# Patient Record
Sex: MALE | Race: WHITE | NOT HISPANIC OR LATINO | Employment: FULL TIME | URBAN - METROPOLITAN AREA
[De-identification: names, ages, dates, MRNs, and addresses within clinical notes are randomized per-mention and may not be internally consistent; named-entity substitution may affect disease eponyms.]

---

## 2023-08-24 ENCOUNTER — OFFICE VISIT (OUTPATIENT)
Dept: FAMILY MEDICINE CLINIC | Facility: CLINIC | Age: 28
End: 2023-08-24
Payer: COMMERCIAL

## 2023-08-24 VITALS
SYSTOLIC BLOOD PRESSURE: 144 MMHG | TEMPERATURE: 99.5 F | DIASTOLIC BLOOD PRESSURE: 96 MMHG | BODY MASS INDEX: 32.96 KG/M2 | HEART RATE: 107 BPM | HEIGHT: 67 IN | OXYGEN SATURATION: 97 % | RESPIRATION RATE: 18 BRPM | WEIGHT: 210 LBS

## 2023-08-24 DIAGNOSIS — U07.1 COVID-19 VIRUS INFECTION: ICD-10-CM

## 2023-08-24 DIAGNOSIS — E66.09 CLASS 1 OBESITY DUE TO EXCESS CALORIES WITH SERIOUS COMORBIDITY AND BODY MASS INDEX (BMI) OF 32.0 TO 32.9 IN ADULT: ICD-10-CM

## 2023-08-24 DIAGNOSIS — R03.0 ELEVATED BP WITHOUT DIAGNOSIS OF HYPERTENSION: ICD-10-CM

## 2023-08-24 PROBLEM — J30.9 ALLERGIC RHINITIS: Status: ACTIVE | Noted: 2023-08-24

## 2023-08-24 LAB
EXPIRATION DATE: ABNORMAL
EXPIRATION DATE: NORMAL
EXPIRATION DATE: NORMAL
FLUAV AG NPH QL: NEGATIVE
FLUBV AG NPH QL: NEGATIVE
INTERNAL CONTROL: ABNORMAL
INTERNAL CONTROL: NORMAL
INTERNAL CONTROL: NORMAL
Lab: ABNORMAL
Lab: NORMAL
Lab: NORMAL
S PYO AG THROAT QL: NEGATIVE
SARS-COV-2 AG UPPER RESP QL IA.RAPID: DETECTED

## 2023-08-24 RX ORDER — GUAIFENESIN AND DEXTROMETHORPHAN HYDROBROMIDE 600; 30 MG/1; MG/1
TABLET, EXTENDED RELEASE ORAL DAILY
COMMUNITY
Start: 2014-02-07

## 2023-08-24 RX ORDER — PSEUDOEPHEDRINE HCL 120 MG/1
120 TABLET, FILM COATED, EXTENDED RELEASE ORAL EVERY 12 HOURS
COMMUNITY

## 2023-08-24 RX ORDER — CETIRIZINE HYDROCHLORIDE 10 MG/1
10 TABLET ORAL DAILY
COMMUNITY

## 2023-08-24 NOTE — LETTER
August 24, 2023     Patient: Tavon Jackson   YOB: 1995   Date of Visit: 8/24/2023       To Whom It May Concern:    It is my medical opinion that Tavon Jackson may return to work on 8/29. Please excuse from 8/25/23-8/28/23         Sincerely,        Israel Fields MD    CC: No Recipients

## 2023-08-24 NOTE — PROGRESS NOTES
"  Established Patient Office Visit        Subjective      Chief Complaint:  Generalized Body Aches (Body aches, cough, congestion, sore throat, wife has been ill but diagnosed with viral infection and starting to feel better.  Symptoms started yesterday.)      History of Present Illness: Tavon Jackson is a 27 y.o. male who presents for   +body aches, congestion, no dyspnea. Chills. Started yesterday.     Patient Active Problem List   Diagnosis    Allergic rhinitis    Obesity due to excess calories with serious comorbidity    Elevated BP without diagnosis of hypertension         Current Outpatient Medications:     cetirizine (zyrTEC) 10 MG tablet, Take 1 tablet by mouth Daily. Take as directed, Disp: , Rfl:     guaifenesin-dextromethorphan (MUCINEX DM)  MG tablet sustained-release 12 hour tablet, Take  by mouth Daily., Disp: , Rfl:     pseudoephedrine (SUDAFED) 120 MG 12 hr tablet, Take 1 tablet by mouth Every 12 (Twelve) Hours. Takes otc  as needed, Disp: , Rfl:        Objective     Physical Exam:   Vital Signs:   /96 (BP Location: Right arm, Patient Position: Sitting, Cuff Size: Adult)   Pulse 107   Temp 99.5 øF (37.5 øC) (Oral)   Resp 18   Ht 170.2 cm (67\")   Wt 95.3 kg (210 lb)   SpO2 97%   BMI 32.89 kg/mý      Physical Exam  Constitutional:       General: He is not in acute distress.     Appearance: He is not ill-appearing.   Cardiovascular:      Rate and Rhythm: Normal rate and regular rhythm.   Pulmonary:      Effort: Pulmonary effort is normal.      Breath sounds: Normal breath sounds.   Neurological:      Mental Status: He is alert.   Psychiatric:         Thought Content: Thought content normal.   Left TM mild erythema with clear effusion. Right TM WNL.          Assessment / Plan      Assessment/Plan:   Diagnoses and all orders for this visit:    1. COVID-19 virus infection  -     POCT rapid strep A  -     POCT Influenza A/B  -     POCT SARS-CoV-2 Antigen BILL    2. Class 1 obesity due to " excess calories with serious comorbidity and body mass index (BMI) of 32.0 to 32.9 in adult  Discussed healthy diet and exercise. Hand out given.     3. Elevated BP without diagnosis of hypertension  Assessment & Plan:  Likely 2/2 acute illness recheck in 3 months       Left ear with some clear effusion. Monitor for symptoms.     It is recommendation by the [place] [facility] to isolate.  If you are asymptomatic at day 5 you can leave isolation if you wear a mask at all times.  If you remain symptomatic or not improving it is recommended that you isolate for 10 days.  Ultimately studies are showing COVID may be contagious up to 10 days.    Tylenol and ibuprofen as needed. I do not recommend any further treatments at this time.  If there is any concern for worsening you can consider buying a pulse oximeter and your oxygen saturation should be greater than 94%.  For any worsening you should call or seek care.      Follow Up:   Return in about 3 months (around 11/24/2023) for Wellness visit.      MDM:     Israel Fields MD  Family Medicine - [place] [facility] [place]

## 2024-01-01 PROCEDURE — 87635 SARS-COV-2 COVID-19 AMP PRB: CPT | Performed by: NURSE PRACTITIONER

## 2024-01-19 ENCOUNTER — LAB (OUTPATIENT)
Dept: LAB | Facility: HOSPITAL | Age: 29
End: 2024-01-19
Payer: COMMERCIAL

## 2024-01-19 ENCOUNTER — OFFICE VISIT (OUTPATIENT)
Dept: FAMILY MEDICINE CLINIC | Facility: CLINIC | Age: 29
End: 2024-01-19
Payer: COMMERCIAL

## 2024-01-19 VITALS
TEMPERATURE: 98.6 F | WEIGHT: 205 LBS | HEIGHT: 69 IN | OXYGEN SATURATION: 98 % | SYSTOLIC BLOOD PRESSURE: 118 MMHG | BODY MASS INDEX: 30.36 KG/M2 | HEART RATE: 97 BPM | DIASTOLIC BLOOD PRESSURE: 86 MMHG

## 2024-01-19 DIAGNOSIS — E66.09 CLASS 1 OBESITY DUE TO EXCESS CALORIES WITH SERIOUS COMORBIDITY AND BODY MASS INDEX (BMI) OF 30.0 TO 30.9 IN ADULT: ICD-10-CM

## 2024-01-19 DIAGNOSIS — L70.9 ACNE, UNSPECIFIED ACNE TYPE: ICD-10-CM

## 2024-01-19 DIAGNOSIS — Z00.00 ENCOUNTER FOR ROUTINE ADULT HEALTH EXAMINATION WITHOUT ABNORMAL FINDINGS: ICD-10-CM

## 2024-01-19 DIAGNOSIS — L98.9 SCALP LESION: ICD-10-CM

## 2024-01-19 LAB
ALBUMIN SERPL-MCNC: 4.5 G/DL (ref 3.5–5.2)
ALBUMIN/GLOB SERPL: 1.6 G/DL
ALP SERPL-CCNC: 70 U/L (ref 39–117)
ALT SERPL W P-5'-P-CCNC: 25 U/L (ref 1–41)
ANION GAP SERPL CALCULATED.3IONS-SCNC: 14 MMOL/L (ref 5–15)
AST SERPL-CCNC: 19 U/L (ref 1–40)
BILIRUB SERPL-MCNC: 0.4 MG/DL (ref 0–1.2)
BUN SERPL-MCNC: 14 MG/DL (ref 6–20)
BUN/CREAT SERPL: 15.4 (ref 7–25)
CALCIUM SPEC-SCNC: 9.2 MG/DL (ref 8.6–10.5)
CHLORIDE SERPL-SCNC: 102 MMOL/L (ref 98–107)
CHOLEST SERPL-MCNC: 209 MG/DL (ref 0–200)
CO2 SERPL-SCNC: 23 MMOL/L (ref 22–29)
CREAT SERPL-MCNC: 0.91 MG/DL (ref 0.76–1.27)
EGFRCR SERPLBLD CKD-EPI 2021: 118.5 ML/MIN/1.73
GLOBULIN UR ELPH-MCNC: 2.8 GM/DL
GLUCOSE SERPL-MCNC: 85 MG/DL (ref 65–99)
HDLC SERPL-MCNC: 30 MG/DL (ref 40–60)
LDLC SERPL CALC-MCNC: 148 MG/DL (ref 0–100)
LDLC/HDLC SERPL: 4.85 {RATIO}
POTASSIUM SERPL-SCNC: 4.3 MMOL/L (ref 3.5–5.2)
PROT SERPL-MCNC: 7.3 G/DL (ref 6–8.5)
SODIUM SERPL-SCNC: 139 MMOL/L (ref 136–145)
TRIGL SERPL-MCNC: 167 MG/DL (ref 0–150)
VLDLC SERPL-MCNC: 31 MG/DL (ref 5–40)

## 2024-01-19 PROCEDURE — 80061 LIPID PANEL: CPT

## 2024-01-19 PROCEDURE — 80053 COMPREHEN METABOLIC PANEL: CPT

## 2024-01-19 RX ORDER — CLINDAMYCIN PHOSPHATE 10 MG/G
1 GEL TOPICAL NIGHTLY
Qty: 60 G | Refills: 2 | Status: SHIPPED | OUTPATIENT
Start: 2024-01-19

## 2024-01-19 NOTE — PROGRESS NOTES
"     Male Physical Note      Patient Name: Tavon Jackson  : 1995   MRN: [id number]     Subjective    Subjective     Chief Complaint:    Chief Complaint   Patient presents with    well adult exam       History of Present Illness: Tavon Jackson is a 28 y.o. male who is here today for their annual health maintenance and physical.      Did have cold in December negative viral testing on 24.     Past Medical History, Social History, Family History and Care Team were all reviewed with patient and updated as appropriate.     Medications:     Current Outpatient Medications:     fexofenadine (ALLEGRA) 180 MG tablet, Take 1 tablet by mouth Daily., Disp: , Rfl:     guaifenesin-dextromethorphan (MUCINEX DM)  MG tablet sustained-release 12 hour tablet, Take  by mouth Daily., Disp: , Rfl:     Benzoyl Peroxide 10 % liquid, Apply small amount to skin leave on 5 minutes then was off., Disp: 227 g, Rfl: 2    clindamycin (Clindagel) 1 % gel, Apply 1 application  topically to the appropriate area as directed Every Night., Disp: 60 g, Rfl: 2    Allergies:   No Known Allergies    Immunizations:  Td/Tdap(Booster Q 10 yrs):  every 10 years   Flu (Yearly):  declines     Hep C: declines     Diet/Physical activity: diet improved. Plans to increase exercise     Depression: PHQ-2 Depression Screening  PHQ-2 Depression Screening  Little interest or pleasure in doing things? 0-->not at all   Feeling down, depressed, or hopeless? 0-->not at all   PHQ-2 Total Score 0         Objective   Objective     Physical Exam:  Vital Signs:   Vitals:    24 0801 24 0829   BP: 138/66 118/86   Pulse: 97    Temp: 98.6 °F (37 °C)    TempSrc: Infrared    SpO2: 98%    Weight: 93 kg (205 lb)    Height: 175.3 cm (69\")    PainSc: 0-No pain      Body mass index is 30.27 kg/m².     Physical Exam  Constitutional:       General: He is not in acute distress.     Appearance: He is not ill-appearing.   Cardiovascular:      Rate and Rhythm: Normal " rate and regular rhythm.   Pulmonary:      Effort: Pulmonary effort is normal.      Breath sounds: Normal breath sounds.   Neurological:      Mental Status: He is alert.   Psychiatric:         Thought Content: Thought content normal.     Cyst to top of scalp mild erythema     Acne to back    Procedures    Assessment / Plan      Assessment/Plan:   Diagnoses and all orders for this visit:    1. Encounter for routine adult health examination without abnormal findings  -     Comprehensive Metabolic Panel; Future  -     Lipid Panel; Future    2. Class 1 obesity due to excess calories with serious comorbidity and body mass index (BMI) of 30.0 to 30.9 in adult  Assessment & Plan:  Patient's (Body mass index is 30.27 kg/m².) indicates that they are obese (BMI >30) with health conditions that include none . Weight is improving with lifestyle modifications. BMI  is above average; BMI management plan is completed. We discussed portion control and increasing exercise.       3. Scalp lesion  -     Ambulatory Referral to Dermatology    4. Acne, unspecified acne type  -     Ambulatory Referral to Dermatology  -     clindamycin (Clindagel) 1 % gel; Apply 1 application  topically to the appropriate area as directed Every Night.  Dispense: 60 g; Refill: 2  -     Benzoyl Peroxide 10 % liquid; Apply small amount to skin leave on 5 minutes then was off.  Dispense: 227 g; Refill: 2       Discussed acne care plan. And rx given in addition to wellness visit. Refer to derm     Declines flu  Recommend tdap every 10 years     Follow Up:   Return in about 1 year (around 1/19/2025).    Healthcare Maintenance:   Health maintenance counseling included discussion of healthy diet and physical activity, Dental hygiene, and vaccinations.  Tavon Jimenezlili voices understanding and acceptance of this advice and will call back with any further questions or concerns. AVS with preventive healthcare tips printed for patient.     Israel Fields MD  Family  Medicine - [place] [facility]

## 2024-01-19 NOTE — ASSESSMENT & PLAN NOTE
Patient's (Body mass index is 30.27 kg/m².) indicates that they are obese (BMI >30) with health conditions that include none . Weight is improving with lifestyle modifications. BMI  is above average; BMI management plan is completed. We discussed portion control and increasing exercise.

## 2024-01-19 NOTE — PATIENT INSTRUCTIONS
Preventive Care 21-39 Years Old, Male  Preventive care refers to lifestyle choices and visits with your health care provider that can promote health and wellness. Preventive care visits are also called wellness exams.  What can I expect for my preventive care visit?  Counseling  During your preventive care visit, your health care provider may ask about your:  Medical history, including:  Past medical problems.  Family medical history.  Current health, including:  Emotional well-being.  Home life and relationship well-being.  Sexual activity.  Lifestyle, including:  Alcohol, nicotine or tobacco, and drug use.  Access to firearms.  Diet, exercise, and sleep habits.  Safety issues such as seatbelt and bike helmet use.  Sunscreen use.  Work and work environment.  Physical exam  Your health care provider may check your:  Height and weight. These may be used to calculate your BMI (body mass index). BMI is a measurement that tells if you are at a healthy weight.  Waist circumference. This measures the distance around your waistline. This measurement also tells if you are at a healthy weight and may help predict your risk of certain diseases, such as type 2 diabetes and high blood pressure.  Heart rate and blood pressure.  Body temperature.  Skin for abnormal spots.  What immunizations do I need?    Vaccines are usually given at various ages, according to a schedule. Your health care provider will recommend vaccines for you based on your age, medical history, and lifestyle or other factors, such as travel or where you work.  What tests do I need?  Screening  Your health care provider may recommend screening tests for certain conditions. This may include:  Lipid and cholesterol levels.  Diabetes screening. This is done by checking your blood sugar (glucose) after you have not eaten for a while (fasting).  Hepatitis B test.  Hepatitis C test.  HIV (human immunodeficiency virus) test.  STI (sexually transmitted infection)  testing, if you are at risk.  Talk with your health care provider about your test results, treatment options, and if necessary, the need for more tests.  Follow these instructions at home:  Eating and drinking    Eat a healthy diet that includes fresh fruits and vegetables, whole grains, lean protein, and low-fat dairy products.  Drink enough fluid to keep your urine pale yellow.  Take vitamin and mineral supplements as recommended by your health care provider.  Do not drink alcohol if your health care provider tells you not to drink.  If you drink alcohol:  Limit how much you have to 0-2 drinks a day.  Know how much alcohol is in your drink. In the [place] one drink equals one 12 oz bottle of beer (355 mL), one 5 oz glass of wine (148 mL), or one 1½ oz glass of hard liquor (44 mL).  Lifestyle  Brush your teeth every morning and night with fluoride toothpaste. Floss one time each day.  Exercise for at least 30 minutes 5 or more days each week.  Do not use any products that contain nicotine or tobacco. These products include cigarettes, chewing tobacco, and vaping devices, such as e-cigarettes. If you need help quitting, ask your health care provider.  Do not use drugs.  If you are sexually active, practice safe sex. Use a condom or other form of protection to prevent STIs.  Find healthy ways to manage stress, such as:  Meditation, yoga, or listening to music.  Journaling.  Talking to a trusted person.  Spending time with friends and family.  Minimize exposure to UV radiation to reduce your risk of skin cancer.  Safety  Always wear your seat belt while driving or riding in a vehicle.  Do not drive:  If you have been drinking alcohol. Do not ride with someone who has been drinking.  If you have been using any mind-altering substances or drugs.  While texting.  When you are tired or distracted.  Wear a helmet and other protective equipment during sports activities.  If you have firearms in your house, make sure you  follow all gun safety procedures.  Seek help if you have been physically or sexually abused.  What's next?  Go to your health care provider once a year for an annual wellness visit.  Ask your health care provider how often you should have your eyes and teeth checked.  Stay up to date on all vaccines.  This information is not intended to replace advice given to you by your health care provider. Make sure you discuss any questions you have with your health care provider.  Document Revised: 06/15/2022 Document Reviewed: 06/15/2022  [employer] Patient Education © 2023 [employer]  Start with OTC benzyl peroxide in morning   Start clindamycin at night     Goal Blood pressure is less than 130/80

## 2024-01-22 ENCOUNTER — TELEPHONE (OUTPATIENT)
Dept: FAMILY MEDICINE CLINIC | Facility: CLINIC | Age: 29
End: 2024-01-22
Payer: COMMERCIAL

## 2024-01-22 NOTE — TELEPHONE ENCOUNTER
"Hub can read       \"\"      Wilma Montes De Oca I sent a referral for you to [facility]. You can call them to set up the appointment at any time [phone]     "